# Patient Record
Sex: MALE | Race: WHITE | NOT HISPANIC OR LATINO | Employment: FULL TIME | URBAN - METROPOLITAN AREA
[De-identification: names, ages, dates, MRNs, and addresses within clinical notes are randomized per-mention and may not be internally consistent; named-entity substitution may affect disease eponyms.]

---

## 2021-05-10 ENCOUNTER — PATIENT MESSAGE (OUTPATIENT)
Dept: RESEARCH | Facility: HOSPITAL | Age: 51
End: 2021-05-10

## 2024-04-03 ENCOUNTER — OFFICE VISIT (OUTPATIENT)
Dept: URGENT CARE | Facility: CLINIC | Age: 54
End: 2024-04-03
Payer: COMMERCIAL

## 2024-04-03 VITALS
OXYGEN SATURATION: 97 % | SYSTOLIC BLOOD PRESSURE: 112 MMHG | DIASTOLIC BLOOD PRESSURE: 67 MMHG | TEMPERATURE: 98 F | RESPIRATION RATE: 16 BRPM | HEART RATE: 75 BPM

## 2024-04-03 DIAGNOSIS — H61.22 CERUMEN DEBRIS ON TYMPANIC MEMBRANE OF LEFT EAR: ICD-10-CM

## 2024-04-03 DIAGNOSIS — H60.502 ACUTE OTITIS EXTERNA OF LEFT EAR, UNSPECIFIED TYPE: Primary | ICD-10-CM

## 2024-04-03 PROCEDURE — 99203 OFFICE O/P NEW LOW 30 MIN: CPT | Mod: S$GLB,,, | Performed by: NURSE PRACTITIONER

## 2024-04-03 RX ORDER — OFLOXACIN 3 MG/ML
10 SOLUTION AURICULAR (OTIC) DAILY
Qty: 5 ML | Refills: 0 | Status: SHIPPED | OUTPATIENT
Start: 2024-04-03 | End: 2024-04-10

## 2024-04-03 NOTE — PATIENT INSTRUCTIONS

## 2024-04-03 NOTE — PROGRESS NOTES
Subjective:      Patient ID: Dale Lemus is a 54 y.o. male.    Vitals:  temperature is 97.7 °F (36.5 °C). His blood pressure is 112/67 and his pulse is 75. His respiration is 16 and oxygen saturation is 97%.     Chief Complaint: Otalgia    Pt presents with left earache, can't hear  Ears itching, draining, burning x 1 week      Dx with Meniere disease, pt has 2 hearing aids.    Provider note begins below:  Patient attempted to irrigate left ear with peroxide prior to arrival. No fever, chills, cough or throat pain.    Otalgia   There is pain in the left ear. This is a new problem. The current episode started 1 to 4 weeks ago. The problem occurs constantly. The problem has been gradually worsening. There has been no fever. The patient is experiencing no pain. Associated symptoms include ear discharge and hearing loss. Pertinent negatives include no abdominal pain, coughing, neck pain, rash or vomiting. Treatments tried: peroxide. The treatment provided mild relief. His past medical history is significant for hearing loss.       Constitution: Negative. Negative for chills, fatigue and fever.   HENT:  Positive for ear pain, ear discharge and hearing loss. Negative for facial swelling and sinus pressure.    Neck: Negative for neck pain, neck stiffness and painful lymph nodes.   Cardiovascular: Negative.  Negative for chest pain and sob on exertion.   Eyes: Negative.  Negative for eye itching, eye pain and eye redness.   Respiratory: Negative.  Negative for chest tightness, cough, shortness of breath, wheezing and asthma.    Gastrointestinal: Negative.  Negative for abdominal pain, nausea and vomiting.   Endocrine: negative. excessive thirst.   Genitourinary: Negative.  Negative for dysuria, frequency, urgency and flank pain.   Musculoskeletal: Negative.  Negative for pain, trauma, joint pain and joint swelling.   Skin: Negative.  Negative for rash, wound, lesion and hives.   Allergic/Immunologic: Negative.  Negative  for eczema, asthma, hives, itching and sneezing.   Neurological: Negative.  Negative for dizziness, passing out, disorientation and altered mental status.   Hematologic/Lymphatic: Negative.  Negative for swollen lymph nodes.   Psychiatric/Behavioral: Negative.  Negative for altered mental status, disorientation and confusion.       Objective:     Physical Exam   Constitutional: He is oriented to person, place, and time. He appears well-developed. He is cooperative.  Non-toxic appearance. He does not appear ill. No distress.   HENT:   Head: Normocephalic and atraumatic.   Ears:   Right Ear: Hearing, tympanic membrane, external ear and ear canal normal. no impacted cerumen  Left Ear: Hearing, tympanic membrane and external ear normal. There is tenderness. no impacted cerumen  Nose: Nose normal. No mucosal edema, rhinorrhea, nasal deformity or congestion. No epistaxis. Right sinus exhibits no maxillary sinus tenderness and no frontal sinus tenderness. Left sinus exhibits no maxillary sinus tenderness and no frontal sinus tenderness.   Mouth/Throat: Uvula is midline, oropharynx is clear and moist and mucous membranes are normal. No trismus in the jaw. Normal dentition. No uvula swelling. No oropharyngeal exudate, posterior oropharyngeal edema or posterior oropharyngeal erythema.   Erythematous left ear canal      Comments: Erythematous left ear canal  Eyes: Conjunctivae and lids are normal. No scleral icterus.   Neck: Trachea normal and phonation normal. Neck supple. No edema present. No erythema present. No neck rigidity present.   Cardiovascular: Normal rate, regular rhythm, normal heart sounds and normal pulses.   Pulmonary/Chest: Effort normal and breath sounds normal. No stridor. No respiratory distress. He has no decreased breath sounds. He has no wheezes. He has no rhonchi. He has no rales. He exhibits no tenderness.   Abdominal: Normal appearance. Soft. flat abdomen   Musculoskeletal: Normal range of motion.          General: No deformity. Normal range of motion.   Lymphadenopathy:     He has no cervical adenopathy.   Neurological: no focal deficit. He is alert, oriented to person, place, and time and at baseline. He exhibits normal muscle tone. Coordination normal.   Skin: Skin is warm, dry, intact, not diaphoretic and not pale.   Psychiatric: His speech is normal and behavior is normal. Mood, judgment and thought content normal.   Nursing note and vitals reviewed.      Assessment:     1. Acute otitis externa of left ear, unspecified type    2. Cerumen debris on tympanic membrane of left ear        Plan:   FOLLOWUP  Follow up if symptoms worsen or fail to improve, for PLEASE CONTACT PCP OR CONTACT THE EMERGENCY ROOM..     PATIENT INSTRUCTIONS  Patient Instructions   INSTRUCTIONS:  - Rest.  - Drink plenty of fluids.  - Take Tylenol and/or Ibuprofen as directed as needed for fever/pain.  Do not take more than the recommended dose.  - follow up with your PCP within the next 1-2 weeks as needed.  - You must understand that you have received an Urgent Care treatment only and that you may be released before all of your medical problems are known or treated.   - You, the patient, will arrange for follow up care as instructed.   - If your condition worsens or fails to improve we recommend that you receive another evaluation at the ER immediately or contact your PCP to discuss your concerns.   - You can call [phone] or [phone] to help schedule an appointment with the appropriate provider.     -If you smoke cigarettes, it would be beneficial for you to stop.         THANK YOU FOR ALLOWING ME TO PARTICIPATE IN YOUR HEALTHCARE,     Edin Finn NP     Acute otitis externa of left ear, unspecified type  -     ofloxacin (FLOXIN) 0.3 % otic solution; Place 10 drops into the left ear once daily. for 7 days  Dispense: 5 mL; Refill: 0    Cerumen debris on tympanic membrane of left ear  -     Ear wax removal

## 2024-09-02 ENCOUNTER — ON-DEMAND VIRTUAL (OUTPATIENT)
Dept: URGENT CARE | Facility: CLINIC | Age: 54
End: 2024-09-02
Payer: COMMERCIAL

## 2024-09-02 DIAGNOSIS — R30.0 DYSURIA: Primary | ICD-10-CM

## 2024-09-02 PROCEDURE — 99213 OFFICE O/P EST LOW 20 MIN: CPT | Mod: 95,,, | Performed by: NURSE PRACTITIONER

## 2024-09-02 RX ORDER — SULFAMETHOXAZOLE AND TRIMETHOPRIM 800; 160 MG/1; MG/1
1 TABLET ORAL 2 TIMES DAILY
Qty: 14 TABLET | Refills: 0 | Status: SHIPPED | OUTPATIENT
Start: 2024-09-02 | End: 2024-09-09

## 2024-09-07 ENCOUNTER — NURSE TRIAGE (OUTPATIENT)
Dept: ADMINISTRATIVE | Facility: CLINIC | Age: 54
End: 2024-09-07
Payer: COMMERCIAL

## 2024-09-07 NOTE — TELEPHONE ENCOUNTER
Patient recently had covid and a UTI. He did a ODVV last week and has been taking Bactrim. Patient reports no improvement in his symptoms of pain and frequency. Dispo provided- go to ER/UC within 24 hours. Instructed to call back with additional questions or worsening of symptoms. Patient verbalized understanding.       Reason for Disposition   [1] Taking antibiotic > 72 hours (3 days) for UTI AND [2] painful urination or frequency is SAME (unchanged, not better)    Additional Information   [1] Taking antibiotic for urinary tract infection (UTI) AND [2] male   Negative: Shock suspected (e.g., cold/pale/clammy skin, too weak to stand, low BP, rapid pulse)   Negative: Sounds like a life-threatening emergency to the triager   Negative: [1] Unable to urinate (or only a few drops) > 4 hours AND [2] bladder feels very full (e.g., palpable bladder or strong urge to urinate)   Negative: Passing pure blood or large blood clots (i.e., size > a dime)  (Exceptions: Flecks, small strands, or pinkish-red color)   Negative: Fever > 103 F (39.4 C)   Negative: Patient sounds very sick or weak to the triager   Negative: [1] SEVERE pain (e.g., excruciating) AND [2] no improvement 2 hours after pain medications   Negative: [1] Fever > 100 F (37.8 C) AND [2] new-onset since starting antibiotics   Negative: [1] Side (flank) or lower back pain AND [2] new-onset since starting antibiotics   Negative: [1] Taking antibiotic > 24 hours for UTI AND [2] flank or lower back pain getting WORSE   Negative: [1] Vomiting 2 or more times AND [2] interferes with taking oral antibiotic   Negative: [1] Taking antibiotic > 24 hours for UTI AND [2] fever persists (still has fever)    Protocols used: Urinary Symptoms-A-AH, Urinary Tract Infection on Antibiotic Follow-up Call - Male-A-AH

## 2024-09-09 NOTE — TELEPHONE ENCOUNTER
He is starting to feel better. He finished the 7 days of Bactrim, but feels like the burning is getting better. Offered an appointment, but doesn't need one at the moment.

## 2025-06-05 ENCOUNTER — TELEPHONE (OUTPATIENT)
Dept: SPINE | Facility: CLINIC | Age: 55
End: 2025-06-05
Payer: COMMERCIAL